# Patient Record
Sex: MALE
[De-identification: names, ages, dates, MRNs, and addresses within clinical notes are randomized per-mention and may not be internally consistent; named-entity substitution may affect disease eponyms.]

---

## 2021-07-26 ENCOUNTER — APPOINTMENT (OUTPATIENT)
Dept: SURGERY | Facility: CLINIC | Age: 26
End: 2021-07-26
Payer: MEDICAID

## 2021-07-26 DIAGNOSIS — Z78.9 OTHER SPECIFIED HEALTH STATUS: ICD-10-CM

## 2021-07-26 DIAGNOSIS — Z87.19 PERSONAL HISTORY OF OTHER DISEASES OF THE DIGESTIVE SYSTEM: ICD-10-CM

## 2021-07-26 DIAGNOSIS — N62 HYPERTROPHY OF BREAST: ICD-10-CM

## 2021-07-26 PROBLEM — Z00.00 ENCOUNTER FOR PREVENTIVE HEALTH EXAMINATION: Status: ACTIVE | Noted: 2021-07-26

## 2021-07-26 PROCEDURE — 99202 OFFICE O/P NEW SF 15 MIN: CPT

## 2021-07-27 PROBLEM — Z78.9 DOES NOT USE ILLICIT DRUGS: Status: ACTIVE | Noted: 2021-07-27

## 2021-07-27 PROBLEM — Z78.9 SOCIAL ALCOHOL USE: Status: ACTIVE | Noted: 2021-07-27

## 2021-07-27 PROBLEM — N62 GYNECOMASTIA, MALE: Status: ACTIVE | Noted: 2021-07-27

## 2021-07-27 PROBLEM — Z78.9 NON-SMOKER: Status: ACTIVE | Noted: 2021-07-27

## 2021-07-27 PROBLEM — Z87.19 HISTORY OF GASTROESOPHAGEAL REFLUX (GERD): Status: RESOLVED | Noted: 2021-07-27 | Resolved: 2021-07-27

## 2021-07-27 RX ORDER — OMEPRAZOLE 40 MG/1
40 CAPSULE, DELAYED RELEASE ORAL
Refills: 0 | Status: ACTIVE | COMMUNITY

## 2021-07-27 NOTE — ASSESSMENT
[FreeTextEntry1] : Bilateral gynecomastia of pubertal onset, diffuse in character and with no suspicious findings or specific underlying etiology.  The patient is quite concerned about cosmetic appearance but these areas are symptomatic and are somewhat tender on examination. Surgical management would require a bilateral subcutaneous mastectomy and for that he should be seen by a plastic and reconstructive surgeon to maximize the cosmetic result. He was given the names of local plastic surgeons with whom he can consult and all his questions were answered. He understands and agrees and is happy with the assessment and plan.

## 2021-07-27 NOTE — PHYSICAL EXAM
[Normal Thyroid] : the thyroid was normal [Normal Breath Sounds] : Normal breath sounds [Normal Heart Sounds] : normal heart sounds [Normal Rate and Rhythm] : normal rate and rhythm [No HSM] : no hepatosplenomegaly [Abdominal Masses] : No abdominal masses [Abdomen Tenderness] : ~T ~M No abdominal tenderness [de-identified] : healthy, normal male hair pattern [de-identified] : no cervical or supraclavicular lymphadenopathy [de-identified] : Moderately enlarged bilaterally, left greater than right. No nipple discharge, nipple retraction, suspicious skin changes bilaterally. No discrete palpable mass or area of suspicion noted in either breast.  The breasts have a diffuse mixed fatty and glandular consistency. They are not pendulous, and are mildly tender on exam with no focal tenderness.. No axillary adenopathy bilaterally. [de-identified] : Testicles normal in size and symmetrical, no palpable masses or other abnormalities.

## 2021-07-27 NOTE — HISTORY OF PRESENT ILLNESS
[de-identified] : The patient comes to be evaluated for surgical management of his gynecomastia. This began bilaterally at least 10 years ago and became more noticeable after some weight loss, he previously weighed 205#.  The areas are asymptomatic except he does have some pain after significant alcohol intake or some other liquids. He has never used steroids and he has not used THC for at least 2 years. He did notice some enlargement of the gynecomastia when he was using THC. His use of PPI is only for 4-5 years. He has no other significant medical history and his review of systems for the development of gynecomastia is otherwise negative. There is no family history of breast cancer.  He denies any nipple discharge, other local skin changes or other symptoms.  He shaves abnormal intervals and notes no changes in either testicle recently